# Patient Record
Sex: FEMALE | Race: WHITE | Employment: OTHER | URBAN - METROPOLITAN AREA
[De-identification: names, ages, dates, MRNs, and addresses within clinical notes are randomized per-mention and may not be internally consistent; named-entity substitution may affect disease eponyms.]

---

## 2018-10-05 ENCOUNTER — OFFICE VISIT (OUTPATIENT)
Dept: FAMILY MEDICINE CLINIC | Facility: CLINIC | Age: 58
End: 2018-10-05

## 2018-10-05 VITALS
TEMPERATURE: 98 F | WEIGHT: 118 LBS | BODY MASS INDEX: 22.57 KG/M2 | RESPIRATION RATE: 16 BRPM | HEART RATE: 70 BPM | OXYGEN SATURATION: 99 % | HEIGHT: 60.5 IN | DIASTOLIC BLOOD PRESSURE: 70 MMHG | SYSTOLIC BLOOD PRESSURE: 120 MMHG

## 2018-10-05 DIAGNOSIS — E04.1 THYROID NODULE: ICD-10-CM

## 2018-10-05 DIAGNOSIS — Z12.4 SCREENING FOR CERVICAL CANCER: ICD-10-CM

## 2018-10-05 DIAGNOSIS — Z00.00 PHYSICAL EXAM, ANNUAL: Primary | ICD-10-CM

## 2018-10-05 DIAGNOSIS — Z12.31 ENCOUNTER FOR SCREENING MAMMOGRAM FOR BREAST CANCER: ICD-10-CM

## 2018-10-05 PROCEDURE — 87624 HPV HI-RISK TYP POOLED RSLT: CPT | Performed by: FAMILY MEDICINE

## 2018-10-05 PROCEDURE — 99396 PREV VISIT EST AGE 40-64: CPT | Performed by: FAMILY MEDICINE

## 2018-10-05 NOTE — PATIENT INSTRUCTIONS
Call [phone] to schedule Mammogram result of the thyroid. Do fasting blood work. Healthy diet. Stay active.

## 2018-10-05 NOTE — PROGRESS NOTES
HPI:   Louise Falcon is a 63year old female who presents for a complete physical exam. Symptoms: denies discharge, itching, burning or dysuria. Patient has no complains. Patient has a history of thyroid nodule.   Did not check ultrasound is recommended walking.   Diet: watches calories closely     REVIEW OF SYSTEMS:   GENERAL: feels well otherwise  SKIN: denies any unusual skin lesions  EYES:denies blurred vision or double vision  HEENT: denies nasal congestion, sinus pain or ST  LUNGS: denies shortness o Visit:  Requested Prescriptions      No prescriptions requested or ordered in this encounter     Call [phone] to schedule US thyroid. Healthy diet. Stay active. Glucosamine/ chondroitin as needed over-the-counter for knee pains.   Imaging & Consult

## 2019-02-22 ENCOUNTER — LAB ENCOUNTER (OUTPATIENT)
Dept: LAB | Age: 59
End: 2019-02-22
Attending: FAMILY MEDICINE
Payer: COMMERCIAL

## 2019-02-22 ENCOUNTER — HOSPITAL ENCOUNTER (OUTPATIENT)
Dept: MAMMOGRAPHY | Age: 59
Discharge: HOME | End: 2019-02-22
Attending: FAMILY MEDICINE
Payer: COMMERCIAL

## 2019-02-22 ENCOUNTER — TELEPHONE (OUTPATIENT)
Dept: FAMILY MEDICINE CLINIC | Facility: CLINIC | Age: 59
End: 2019-02-22

## 2019-02-22 DIAGNOSIS — Z78.0 POST-MENOPAUSAL: Primary | ICD-10-CM

## 2019-02-22 DIAGNOSIS — Z12.31 ENCOUNTER FOR SCREENING MAMMOGRAM FOR BREAST CANCER: ICD-10-CM

## 2019-02-22 DIAGNOSIS — Z00.00 PHYSICAL EXAM, ANNUAL: ICD-10-CM

## 2019-02-22 LAB
ALBUMIN SERPL-MCNC: 3.8 G/DL (ref 3.4–5)
ALBUMIN/GLOB SERPL: 1 {RATIO} (ref 1–2)
ALP LIVER SERPL-CCNC: 61 U/L (ref 46–118)
ALT SERPL-CCNC: 24 U/L (ref 13–56)
ANION GAP SERPL CALC-SCNC: 7 MMOL/L (ref 0–18)
AST SERPL-CCNC: 19 U/L (ref 15–37)
BASOPHILS # BLD AUTO: 0.03 X10(3) UL (ref 0–0.2)
BASOPHILS NFR BLD AUTO: 0.6 %
BILIRUB SERPL-MCNC: 0.4 MG/DL (ref 0.1–2)
BUN BLD-MCNC: 15 MG/DL (ref 7–18)
BUN/CREAT SERPL: 18.8 (ref 10–20)
CALCIUM BLD-MCNC: 8.8 MG/DL (ref 8.5–10.1)
CHLORIDE SERPL-SCNC: 107 MMOL/L (ref 98–107)
CHOLEST SMN-MCNC: 239 MG/DL (ref ?–200)
CO2 SERPL-SCNC: 27 MMOL/L (ref 21–32)
CREAT BLD-MCNC: 0.8 MG/DL (ref 0.55–1.02)
DEPRECATED RDW RBC AUTO: 42.8 FL (ref 35.1–46.3)
EOSINOPHIL # BLD AUTO: 0.09 X10(3) UL (ref 0–0.7)
EOSINOPHIL NFR BLD AUTO: 1.7 %
ERYTHROCYTE [DISTWIDTH] IN BLOOD BY AUTOMATED COUNT: 13.2 % (ref 11–15)
GLOBULIN PLAS-MCNC: 3.7 G/DL (ref 2.8–4.4)
GLUCOSE BLD-MCNC: 90 MG/DL (ref 70–99)
HCT VFR BLD AUTO: 40.9 % (ref 35–48)
HDLC SERPL-MCNC: 59 MG/DL (ref 40–59)
HGB BLD-MCNC: 13.6 G/DL (ref 12–16)
IMM GRANULOCYTES # BLD AUTO: 0.01 X10(3) UL (ref 0–1)
IMM GRANULOCYTES NFR BLD: 0.2 %
LDLC SERPL CALC-MCNC: 162 MG/DL (ref ?–100)
LYMPHOCYTES # BLD AUTO: 2.64 X10(3) UL (ref 1–4)
LYMPHOCYTES NFR BLD AUTO: 51 %
M PROTEIN MFR SERPL ELPH: 7.5 G/DL (ref 6.4–8.2)
MCH RBC QN AUTO: 29.8 PG (ref 26–34)
MCHC RBC AUTO-ENTMCNC: 33.3 G/DL (ref 31–37)
MCV RBC AUTO: 89.5 FL (ref 80–100)
MONOCYTES # BLD AUTO: 0.34 X10(3) UL (ref 0.1–1)
MONOCYTES NFR BLD AUTO: 6.6 %
NEUTROPHILS # BLD AUTO: 2.07 X10 (3) UL (ref 1.5–7.7)
NEUTROPHILS # BLD AUTO: 2.07 X10(3) UL (ref 1.5–7.7)
NEUTROPHILS NFR BLD AUTO: 39.9 %
NONHDLC SERPL-MCNC: 180 MG/DL (ref ?–130)
OSMOLALITY SERPL CALC.SUM OF ELEC: 292 MOSM/KG (ref 275–295)
PLATELET # BLD AUTO: 265 10(3)UL (ref 150–450)
POTASSIUM SERPL-SCNC: 4.2 MMOL/L (ref 3.5–5.1)
RBC # BLD AUTO: 4.57 X10(6)UL (ref 3.8–5.3)
SODIUM SERPL-SCNC: 141 MMOL/L (ref 136–145)
TRIGL SERPL-MCNC: 88 MG/DL (ref 30–149)
TSI SER-ACNC: 1.09 MIU/ML (ref 0.36–3.74)
VLDLC SERPL CALC-MCNC: 18 MG/DL (ref 0–30)
WBC # BLD AUTO: 5.2 X10(3) UL (ref 4–11)

## 2019-02-22 PROCEDURE — 80050 GENERAL HEALTH PANEL: CPT | Performed by: FAMILY MEDICINE

## 2019-02-22 PROCEDURE — 77063 BREAST TOMOSYNTHESIS BI: CPT | Performed by: FAMILY MEDICINE

## 2019-02-22 PROCEDURE — 36415 COLL VENOUS BLD VENIPUNCTURE: CPT | Performed by: FAMILY MEDICINE

## 2019-02-22 PROCEDURE — 80061 LIPID PANEL: CPT | Performed by: FAMILY MEDICINE

## 2019-02-22 PROCEDURE — 77067 SCR MAMMO BI INCL CAD: CPT | Performed by: FAMILY MEDICINE

## 2019-02-22 NOTE — TELEPHONE ENCOUNTER
See below. Do you wish her to have a DEXA? I don't see she has ever had one. I have pended if you agree, dx post menopausal. Please advise thanks.

## 2019-02-22 NOTE — TELEPHONE ENCOUNTER
Pt daughter walked in today and stated that pt had an appt in October where her mammogram was ordered. Pt now has insurance and would like to get a DEXA scan as well. Please advise.

## 2019-02-22 NOTE — TELEPHONE ENCOUNTER
We usually order DEXA scan closer to age 62 for postmenopausal patients. I would suggest to call the insurance to make sure that this test is covered. Other option is to wait on the physical and we will discuss DEXA order at that time.   Thank you

## 2019-02-23 NOTE — TELEPHONE ENCOUNTER
I have left detailed msg per hippa advising of below from Dr Zaki Garay. Asked her to cb if further questions.

## 2019-10-28 ENCOUNTER — OFFICE VISIT (OUTPATIENT)
Dept: FAMILY MEDICINE CLINIC | Facility: CLINIC | Age: 59
End: 2019-10-28
Payer: COMMERCIAL

## 2019-10-28 VITALS
WEIGHT: 120 LBS | SYSTOLIC BLOOD PRESSURE: 136 MMHG | HEART RATE: 62 BPM | BODY MASS INDEX: 23.56 KG/M2 | DIASTOLIC BLOOD PRESSURE: 74 MMHG | HEIGHT: 60 IN | TEMPERATURE: 98 F | OXYGEN SATURATION: 98 %

## 2019-10-28 DIAGNOSIS — R42 VERTIGO: Primary | ICD-10-CM

## 2019-10-28 PROCEDURE — 99213 OFFICE O/P EST LOW 20 MIN: CPT | Performed by: NURSE PRACTITIONER

## 2019-10-28 RX ORDER — MECLIZINE HYDROCHLORIDE 25 MG/1
25 TABLET ORAL 3 TIMES DAILY PRN
Qty: 18 TABLET | Refills: 0 | Status: SHIPPED | OUTPATIENT
Start: 2019-10-28 | End: 2019-11-03

## 2019-10-28 NOTE — PROGRESS NOTES
CHIEF COMPLAINT:     Patient presents with:  Dizziness: dizzy, nausea, fatigue, indigestion, started today.   :      HPI:     Dot Mealing is a 63year old female presents with complaints of dizziness. Patient has had symptoms for 1.  Patient has had s GENERAL: well developed, well nourished,in no apparent distress  SKIN: no rashes or suspicious lesions  EYES: Pupils round, reactive to light and accomodation. EOMI. Fundoscopic exam negative for papilledema. HENT: atraumatic, normocephalic.  TMs no bul Keep all areas well lit. This helps your eyes send the right signals to the brain. It also makes you less likely to trip and fall. If bright lights make symptoms worse, dim the lights or lie in a dark room until the dizziness passes.  Then turn the lights b © 0641-0847 [employer] [address]. [address] [facility], [address] [place]. All rights reserved. This information is not intended as a substitute for professional medical care. Always follow your healthcare professional's instructions.             The

## 2019-10-28 NOTE — PATIENT INSTRUCTIONS
Dizziness (Vertigo) and Balance Problems: Staying Safe     Replace burned-out light bulbs to keep your home safe and well lit. Falls or accidents can lead to pain, broken bones, and fear of future falls.  Protect yourself and others by preparing for e · Take public transportation. · Walk to stores and other places when you can. Asking for help  Don't be afraid to ask for help running errands, cooking meals, and doing exercise.  Whether it's a friend, loved one, neighbor, or stranger on the street, deandre man

## 2020-11-17 ENCOUNTER — TELEMEDICINE (OUTPATIENT)
Dept: FAMILY MEDICINE CLINIC | Facility: CLINIC | Age: 60
End: 2020-11-17
Payer: COMMERCIAL

## 2020-11-17 DIAGNOSIS — R63.0 DECREASED APPETITE: ICD-10-CM

## 2020-11-17 DIAGNOSIS — R50.9 FEVER, UNSPECIFIED FEVER CAUSE: ICD-10-CM

## 2020-11-17 DIAGNOSIS — R52 BODY ACHES: ICD-10-CM

## 2020-11-17 DIAGNOSIS — R53.83 FATIGUE, UNSPECIFIED TYPE: ICD-10-CM

## 2020-11-17 DIAGNOSIS — R68.83 CHILLS: ICD-10-CM

## 2020-11-17 DIAGNOSIS — R05.9 COUGH: ICD-10-CM

## 2020-11-17 DIAGNOSIS — Z20.822 SUSPECTED COVID-19 VIRUS INFECTION: Primary | ICD-10-CM

## 2020-11-17 PROCEDURE — 99213 OFFICE O/P EST LOW 20 MIN: CPT | Performed by: FAMILY MEDICINE

## 2020-11-17 RX ORDER — AZITHROMYCIN 250 MG/1
TABLET, FILM COATED ORAL
Qty: 6 TABLET | Refills: 0 | Status: SHIPPED | OUTPATIENT
Start: 2020-11-17 | End: 2020-11-22

## 2020-11-17 NOTE — PROGRESS NOTES
Subjective   Fever, body aches, decreased appetite, stomach upset, cough, chills  HPI:     Telehealth outside of [facility] Consent   I conducted a telehealth visit with Juice Coleman today, 11/17/20, which was completed using two-way, r live, interactive video and audio. Patient contacted our office not feeling well for the past couple of weeks. She had intermittent fever on and off.   Recently noted that her fever in the morning as low today was 45.8 degrees of Celsius and at night g hydration. Start Zithromax since having intermittent fevers and take per directions. Take a probiotic daily over-the-counter for stomach upset  Tylenol as needed for fever or body aches over-the-counter. Mucinex DM as needed for cough over the counter.

## 2021-01-11 ENCOUNTER — LAB ENCOUNTER (OUTPATIENT)
Dept: LAB | Age: 61
End: 2021-01-11
Attending: FAMILY MEDICINE
Payer: COMMERCIAL

## 2021-01-11 ENCOUNTER — OFFICE VISIT (OUTPATIENT)
Dept: FAMILY MEDICINE CLINIC | Facility: CLINIC | Age: 61
End: 2021-01-11
Payer: COMMERCIAL

## 2021-01-11 VITALS
SYSTOLIC BLOOD PRESSURE: 110 MMHG | BODY MASS INDEX: 22.76 KG/M2 | OXYGEN SATURATION: 100 % | RESPIRATION RATE: 16 BRPM | HEART RATE: 67 BPM | WEIGHT: 119 LBS | TEMPERATURE: 98 F | HEIGHT: 60.5 IN | DIASTOLIC BLOOD PRESSURE: 62 MMHG

## 2021-01-11 DIAGNOSIS — Z00.00 PHYSICAL EXAM, ANNUAL: Primary | ICD-10-CM

## 2021-01-11 DIAGNOSIS — E55.9 VITAMIN D DEFICIENCY: ICD-10-CM

## 2021-01-11 DIAGNOSIS — Z12.31 ENCOUNTER FOR SCREENING MAMMOGRAM FOR MALIGNANT NEOPLASM OF BREAST: ICD-10-CM

## 2021-01-11 DIAGNOSIS — Z13.89 SCREENING FOR GENITOURINARY CONDITION: ICD-10-CM

## 2021-01-11 DIAGNOSIS — Z00.00 LABORATORY EXAMINATION ORDERED AS PART OF A ROUTINE GENERAL MEDICAL EXAMINATION: ICD-10-CM

## 2021-01-11 DIAGNOSIS — Z12.4 SCREENING FOR MALIGNANT NEOPLASM OF CERVIX: ICD-10-CM

## 2021-01-11 DIAGNOSIS — E04.1 THYROID NODULE: ICD-10-CM

## 2021-01-11 LAB
ALBUMIN SERPL-MCNC: 4 G/DL (ref 3.4–5)
ALBUMIN/GLOB SERPL: 1.1 {RATIO} (ref 1–2)
ALP LIVER SERPL-CCNC: 58 U/L
ALT SERPL-CCNC: 27 U/L
ANION GAP SERPL CALC-SCNC: 6 MMOL/L (ref 0–18)
AST SERPL-CCNC: 24 U/L (ref 15–37)
BASOPHILS # BLD AUTO: 0.05 X10(3) UL (ref 0–0.2)
BASOPHILS NFR BLD AUTO: 0.9 %
BILIRUB SERPL-MCNC: 0.4 MG/DL (ref 0.1–2)
BILIRUB UR QL STRIP.AUTO: NEGATIVE
BUN BLD-MCNC: 13 MG/DL (ref 7–18)
BUN/CREAT SERPL: 16.3 (ref 10–20)
CALCIUM BLD-MCNC: 9.2 MG/DL (ref 8.5–10.1)
CHLORIDE SERPL-SCNC: 105 MMOL/L (ref 98–112)
CHOLEST SMN-MCNC: 252 MG/DL (ref ?–200)
CLARITY UR REFRACT.AUTO: CLEAR
CO2 SERPL-SCNC: 27 MMOL/L (ref 21–32)
COLOR UR AUTO: YELLOW
CREAT BLD-MCNC: 0.8 MG/DL
DEPRECATED RDW RBC AUTO: 45.5 FL (ref 35.1–46.3)
EOSINOPHIL # BLD AUTO: 0.06 X10(3) UL (ref 0–0.7)
EOSINOPHIL NFR BLD AUTO: 1.1 %
ERYTHROCYTE [DISTWIDTH] IN BLOOD BY AUTOMATED COUNT: 13.7 % (ref 11–15)
GLOBULIN PLAS-MCNC: 3.6 G/DL (ref 2.8–4.4)
GLUCOSE BLD-MCNC: 91 MG/DL (ref 70–99)
GLUCOSE UR STRIP.AUTO-MCNC: NEGATIVE MG/DL
HCT VFR BLD AUTO: 43.6 %
HDLC SERPL-MCNC: 69 MG/DL (ref 40–59)
HGB BLD-MCNC: 14.3 G/DL
IMM GRANULOCYTES # BLD AUTO: 0.01 X10(3) UL (ref 0–1)
IMM GRANULOCYTES NFR BLD: 0.2 %
KETONES UR STRIP.AUTO-MCNC: NEGATIVE MG/DL
LDLC SERPL CALC-MCNC: 160 MG/DL (ref ?–100)
LEUKOCYTE ESTERASE UR QL STRIP.AUTO: NEGATIVE
LYMPHOCYTES # BLD AUTO: 2.13 X10(3) UL (ref 1–4)
LYMPHOCYTES NFR BLD AUTO: 38.4 %
M PROTEIN MFR SERPL ELPH: 7.6 G/DL (ref 6.4–8.2)
MCH RBC QN AUTO: 29.7 PG (ref 26–34)
MCHC RBC AUTO-ENTMCNC: 32.8 G/DL (ref 31–37)
MCV RBC AUTO: 90.6 FL
MONOCYTES # BLD AUTO: 0.32 X10(3) UL (ref 0.1–1)
MONOCYTES NFR BLD AUTO: 5.8 %
NEUTROPHILS # BLD AUTO: 2.97 X10 (3) UL (ref 1.5–7.7)
NEUTROPHILS # BLD AUTO: 2.97 X10(3) UL (ref 1.5–7.7)
NEUTROPHILS NFR BLD AUTO: 53.6 %
NITRITE UR QL STRIP.AUTO: NEGATIVE
NONHDLC SERPL-MCNC: 183 MG/DL (ref ?–130)
OSMOLALITY SERPL CALC.SUM OF ELEC: 286 MOSM/KG (ref 275–295)
PATIENT FASTING Y/N/NP: YES
PATIENT FASTING Y/N/NP: YES
PH UR STRIP.AUTO: 6 [PH] (ref 4.5–8)
PLATELET # BLD AUTO: 294 10(3)UL (ref 150–450)
POTASSIUM SERPL-SCNC: 5.1 MMOL/L (ref 3.5–5.1)
PROT UR STRIP.AUTO-MCNC: NEGATIVE MG/DL
RBC # BLD AUTO: 4.81 X10(6)UL
RBC UR QL AUTO: NEGATIVE
SODIUM SERPL-SCNC: 138 MMOL/L (ref 136–145)
SP GR UR STRIP.AUTO: 1.01 (ref 1–1.03)
TRIGL SERPL-MCNC: 115 MG/DL (ref 30–149)
TSI SER-ACNC: 0.88 MIU/ML (ref 0.36–3.74)
UROBILINOGEN UR STRIP.AUTO-MCNC: <2 MG/DL
VIT D+METAB SERPL-MCNC: 36.3 NG/ML (ref 30–100)
VLDLC SERPL CALC-MCNC: 23 MG/DL (ref 0–30)
WBC # BLD AUTO: 5.5 X10(3) UL (ref 4–11)

## 2021-01-11 PROCEDURE — 82306 VITAMIN D 25 HYDROXY: CPT | Performed by: FAMILY MEDICINE

## 2021-01-11 PROCEDURE — 3078F DIAST BP <80 MM HG: CPT | Performed by: FAMILY MEDICINE

## 2021-01-11 PROCEDURE — 3008F BODY MASS INDEX DOCD: CPT | Performed by: FAMILY MEDICINE

## 2021-01-11 PROCEDURE — 80061 LIPID PANEL: CPT | Performed by: FAMILY MEDICINE

## 2021-01-11 PROCEDURE — 88175 CYTOPATH C/V AUTO FLUID REDO: CPT | Performed by: FAMILY MEDICINE

## 2021-01-11 PROCEDURE — 87624 HPV HI-RISK TYP POOLED RSLT: CPT | Performed by: FAMILY MEDICINE

## 2021-01-11 PROCEDURE — 80050 GENERAL HEALTH PANEL: CPT | Performed by: FAMILY MEDICINE

## 2021-01-11 PROCEDURE — 99396 PREV VISIT EST AGE 40-64: CPT | Performed by: FAMILY MEDICINE

## 2021-01-11 PROCEDURE — 81003 URINALYSIS AUTO W/O SCOPE: CPT | Performed by: FAMILY MEDICINE

## 2021-01-11 PROCEDURE — 36415 COLL VENOUS BLD VENIPUNCTURE: CPT | Performed by: FAMILY MEDICINE

## 2021-01-11 PROCEDURE — 3074F SYST BP LT 130 MM HG: CPT | Performed by: FAMILY MEDICINE

## 2021-01-11 NOTE — PROGRESS NOTES
HPI:   Kalpana Sr is a 62year old female who presents for a complete physical exam. Symptoms: denies discharge, itching, burning or dysuria. Patient has no complains. Patient has a history of thyroid nodule.   Did not check ultrasound is recommended • Hypertension Sister    • Other (Other) Sister         kidney stones      Social History:   Social History    Tobacco Use      Smoking status: Never Smoker      Smokeless tobacco: Never Used    Alcohol use: No      Alcohol/week: 0.0 standard drinks intact,motor and sensory are grossly intact    ASSESSMENT AND PLAN:   Heather Beavers is a 62year old female who presents for a complete physical exam.  Physical exam, annual  (primary encounter diagnosis)  Laboratory examination ordered as part of a rou

## 2021-01-13 LAB — HPV I/H RISK 1 DNA SPEC QL NAA+PROBE: NEGATIVE

## 2021-04-19 ENCOUNTER — HOSPITAL ENCOUNTER (OUTPATIENT)
Dept: MAMMOGRAPHY | Age: 61
Discharge: HOME | End: 2021-04-19
Attending: FAMILY MEDICINE
Payer: COMMERCIAL

## 2021-04-19 DIAGNOSIS — Z12.31 ENCOUNTER FOR SCREENING MAMMOGRAM FOR MALIGNANT NEOPLASM OF BREAST: ICD-10-CM

## 2021-04-19 PROCEDURE — 77063 BREAST TOMOSYNTHESIS BI: CPT | Performed by: FAMILY MEDICINE

## 2021-04-19 PROCEDURE — 77067 SCR MAMMO BI INCL CAD: CPT | Performed by: FAMILY MEDICINE

## 2021-04-21 ENCOUNTER — HOSPITAL ENCOUNTER (OUTPATIENT)
Dept: MAMMOGRAPHY | Facility: HOSPITAL | Age: 61
Discharge: HOME | End: 2021-04-21
Attending: FAMILY MEDICINE
Payer: COMMERCIAL

## 2021-04-21 DIAGNOSIS — R92.2 INCONCLUSIVE MAMMOGRAM: ICD-10-CM

## 2021-04-21 PROCEDURE — 77065 DX MAMMO INCL CAD UNI: CPT | Performed by: FAMILY MEDICINE

## 2021-04-21 PROCEDURE — 77061 BREAST TOMOSYNTHESIS UNI: CPT | Performed by: FAMILY MEDICINE

## 2021-04-21 PROCEDURE — 76642 ULTRASOUND BREAST LIMITED: CPT | Performed by: FAMILY MEDICINE

## 2022-07-18 ENCOUNTER — OFFICE VISIT (OUTPATIENT)
Dept: FAMILY MEDICINE CLINIC | Facility: CLINIC | Age: 62
End: 2022-07-18
Payer: COMMERCIAL

## 2022-07-18 VITALS
WEIGHT: 123 LBS | DIASTOLIC BLOOD PRESSURE: 76 MMHG | SYSTOLIC BLOOD PRESSURE: 120 MMHG | RESPIRATION RATE: 16 BRPM | HEIGHT: 60.5 IN | HEART RATE: 68 BPM | TEMPERATURE: 98 F | BODY MASS INDEX: 23.52 KG/M2

## 2022-07-18 DIAGNOSIS — Z00.00 PHYSICAL EXAM, ANNUAL: Primary | ICD-10-CM

## 2022-07-18 DIAGNOSIS — Z00.00 LABORATORY EXAMINATION ORDERED AS PART OF A ROUTINE GENERAL MEDICAL EXAMINATION: ICD-10-CM

## 2022-07-18 DIAGNOSIS — Z12.31 SCREENING MAMMOGRAM FOR BREAST CANCER: ICD-10-CM

## 2022-07-18 DIAGNOSIS — Z13.89 SCREENING FOR GENITOURINARY CONDITION: ICD-10-CM

## 2022-07-18 DIAGNOSIS — E55.9 VITAMIN D DEFICIENCY: ICD-10-CM

## 2022-07-18 NOTE — PATIENT INSTRUCTIONS
Shingrix-shingles shot. Healthy diet. Stay active. Call [phone] to schedule Mammogram.  Fasting blood work.

## 2022-07-20 ENCOUNTER — LAB ENCOUNTER (OUTPATIENT)
Dept: LAB | Age: 62
End: 2022-07-20
Attending: FAMILY MEDICINE
Payer: COMMERCIAL

## 2022-07-20 DIAGNOSIS — E55.9 VITAMIN D DEFICIENCY: ICD-10-CM

## 2022-07-20 DIAGNOSIS — Z13.89 SCREENING FOR GENITOURINARY CONDITION: ICD-10-CM

## 2022-07-20 DIAGNOSIS — Z00.00 PHYSICAL EXAM, ANNUAL: ICD-10-CM

## 2022-07-20 LAB
ALBUMIN SERPL-MCNC: 3.6 G/DL (ref 3.4–5)
ALBUMIN/GLOB SERPL: 1 {RATIO} (ref 1–2)
ALP LIVER SERPL-CCNC: 61 U/L
ALT SERPL-CCNC: 18 U/L
ANION GAP SERPL CALC-SCNC: 4 MMOL/L (ref 0–18)
AST SERPL-CCNC: 15 U/L (ref 15–37)
BASOPHILS # BLD AUTO: 0.04 X10(3) UL (ref 0–0.2)
BASOPHILS NFR BLD AUTO: 0.5 %
BILIRUB SERPL-MCNC: 0.6 MG/DL (ref 0.1–2)
BILIRUB UR QL STRIP.AUTO: NEGATIVE
BUN BLD-MCNC: 17 MG/DL (ref 7–18)
CALCIUM BLD-MCNC: 9.4 MG/DL (ref 8.5–10.1)
CHLORIDE SERPL-SCNC: 107 MMOL/L (ref 98–112)
CHOLEST SERPL-MCNC: 246 MG/DL (ref ?–200)
CLARITY UR REFRACT.AUTO: CLEAR
CO2 SERPL-SCNC: 26 MMOL/L (ref 21–32)
COLOR UR AUTO: YELLOW
CREAT BLD-MCNC: 0.9 MG/DL
EOSINOPHIL # BLD AUTO: 0.11 X10(3) UL (ref 0–0.7)
EOSINOPHIL NFR BLD AUTO: 1.5 %
ERYTHROCYTE [DISTWIDTH] IN BLOOD BY AUTOMATED COUNT: 13.2 %
FASTING PATIENT LIPID ANSWER: YES
FASTING STATUS PATIENT QL REPORTED: YES
GLOBULIN PLAS-MCNC: 3.7 G/DL (ref 2.8–4.4)
GLUCOSE BLD-MCNC: 100 MG/DL (ref 70–99)
GLUCOSE UR STRIP.AUTO-MCNC: NEGATIVE MG/DL
HCT VFR BLD AUTO: 43.1 %
HDLC SERPL-MCNC: 60 MG/DL (ref 40–59)
HGB BLD-MCNC: 13.6 G/DL
IMM GRANULOCYTES # BLD AUTO: 0.02 X10(3) UL (ref 0–1)
IMM GRANULOCYTES NFR BLD: 0.3 %
KETONES UR STRIP.AUTO-MCNC: NEGATIVE MG/DL
LDLC SERPL CALC-MCNC: 164 MG/DL (ref ?–100)
LEUKOCYTE ESTERASE UR QL STRIP.AUTO: NEGATIVE
LYMPHOCYTES # BLD AUTO: 2.12 X10(3) UL (ref 1–4)
LYMPHOCYTES NFR BLD AUTO: 29 %
MCH RBC QN AUTO: 29.4 PG (ref 26–34)
MCHC RBC AUTO-ENTMCNC: 31.6 G/DL (ref 31–37)
MCV RBC AUTO: 93.3 FL
MONOCYTES # BLD AUTO: 0.47 X10(3) UL (ref 0.1–1)
MONOCYTES NFR BLD AUTO: 6.4 %
NEUTROPHILS # BLD AUTO: 4.55 X10 (3) UL (ref 1.5–7.7)
NEUTROPHILS # BLD AUTO: 4.55 X10(3) UL (ref 1.5–7.7)
NEUTROPHILS NFR BLD AUTO: 62.3 %
NITRITE UR QL STRIP.AUTO: NEGATIVE
NONHDLC SERPL-MCNC: 186 MG/DL (ref ?–130)
OSMOLALITY SERPL CALC.SUM OF ELEC: 286 MOSM/KG (ref 275–295)
PH UR STRIP.AUTO: 6.5 [PH] (ref 5–8)
PLATELET # BLD AUTO: 283 10(3)UL (ref 150–450)
POTASSIUM SERPL-SCNC: 4.7 MMOL/L (ref 3.5–5.1)
PROT SERPL-MCNC: 7.3 G/DL (ref 6.4–8.2)
PROT UR STRIP.AUTO-MCNC: NEGATIVE MG/DL
RBC # BLD AUTO: 4.62 X10(6)UL
RBC UR QL AUTO: NEGATIVE
SODIUM SERPL-SCNC: 137 MMOL/L (ref 136–145)
SP GR UR STRIP.AUTO: 1.02 (ref 1–1.03)
TRIGL SERPL-MCNC: 123 MG/DL (ref 30–149)
TSI SER-ACNC: 0.96 MIU/ML (ref 0.36–3.74)
UROBILINOGEN UR STRIP.AUTO-MCNC: 0.2 MG/DL
VIT D+METAB SERPL-MCNC: 28.8 NG/ML (ref 30–100)
VLDLC SERPL CALC-MCNC: 24 MG/DL (ref 0–30)
WBC # BLD AUTO: 7.3 X10(3) UL (ref 4–11)

## 2022-07-20 PROCEDURE — 80053 COMPREHEN METABOLIC PANEL: CPT

## 2022-07-20 PROCEDURE — 82306 VITAMIN D 25 HYDROXY: CPT

## 2022-07-20 PROCEDURE — 36415 COLL VENOUS BLD VENIPUNCTURE: CPT

## 2022-07-20 PROCEDURE — 84443 ASSAY THYROID STIM HORMONE: CPT

## 2022-07-20 PROCEDURE — 80061 LIPID PANEL: CPT

## 2022-07-20 PROCEDURE — 81003 URINALYSIS AUTO W/O SCOPE: CPT

## 2022-07-20 PROCEDURE — 85025 COMPLETE CBC W/AUTO DIFF WBC: CPT

## 2022-07-22 DIAGNOSIS — R73.9 HYPERGLYCEMIA: ICD-10-CM

## 2022-07-22 DIAGNOSIS — E55.9 VITAMIN D DEFICIENCY: Primary | ICD-10-CM

## 2022-07-22 DIAGNOSIS — E78.00 HYPERCHOLESTEROLEMIA: ICD-10-CM

## 2022-07-25 DIAGNOSIS — E55.9 VITAMIN D DEFICIENCY: Primary | ICD-10-CM

## 2022-07-29 ENCOUNTER — HOSPITAL ENCOUNTER (OUTPATIENT)
Dept: MAMMOGRAPHY | Age: 62
Discharge: HOME | End: 2022-07-29
Attending: FAMILY MEDICINE
Payer: COMMERCIAL

## 2022-07-29 DIAGNOSIS — Z12.31 SCREENING MAMMOGRAM FOR BREAST CANCER: ICD-10-CM

## 2022-07-29 PROCEDURE — 77063 BREAST TOMOSYNTHESIS BI: CPT | Performed by: FAMILY MEDICINE

## 2022-07-29 PROCEDURE — 77067 SCR MAMMO BI INCL CAD: CPT | Performed by: FAMILY MEDICINE

## 2022-10-08 ENCOUNTER — HOSPITAL ENCOUNTER (OUTPATIENT)
Dept: CT IMAGING | Facility: HOSPITAL | Age: 62
Discharge: HOME | End: 2022-10-08
Attending: FAMILY MEDICINE

## 2022-10-08 DIAGNOSIS — Z13.6 SCREENING FOR CARDIOVASCULAR CONDITION: ICD-10-CM

## 2022-10-08 NOTE — PROGRESS NOTES
Date of Service 10/8/2022    Violette García  Date of Birth 9/29/1960    Patient Age: 65year old    [facility]    Consult Type  Type Scan/Screening: Heart Scan  Preliminary Heart Scan Score: 71.53     Consult completed with daughter due to language barrier                Body Mass Index  There is no height or weight on file to calculate BMI. Lipid Profile  Cholesterol: 246, done on 7/20/2022. HDL Cholesterol: 60, done on 7/20/2022. LDL Cholesterol: 164, done on 7/20/2022. TriGlycerides 123, done on 7/20/2022. Glucose 100, done one 7/20/2022    Nurse Review  Risk factor information and results reviewed with Nurse: Yes, family history, cholesterol levels    Recommended Follow Up:  Consult your physician regarding[de-identified] Final Heart Scan Report; Discuss potential for Incidental Finding    Free PV Screening offered to patient. To be scheduled for AAA and carotid screening.  will call daughter to schedule, due to language barrier      Recommendations for Change:  Nutrition Changes: Low Saturated Fat;Low Fat Dairy trend more toward plant based and fish  Cholesterol Modification (goal of therapy depends upon your risk): Decrease LDL (Lousy/Bad) Ideal <100;Decrease Total Normal <200  Exercise: No Change Needed, rides bike, walks on treadmill or outdoors regularly     Weight Management: Maintain Current Weight     Repeat Heart Scan: 3 Years if Calcium Score is > 0. 0; Discuss with your Physician          Adrián Recommended Resources:  Recommended Resources: Upcoming Classes, Medical Services and Health Library [email or web address]. Ranjeet Villar, RN        Please Contact the Nurse Heart Line with any Questions or Concerns [phone].

## 2022-10-10 ENCOUNTER — ORDER TRANSCRIPTION (OUTPATIENT)
Dept: ADMINISTRATIVE | Facility: HOSPITAL | Age: 62
End: 2022-10-10

## 2022-10-10 DIAGNOSIS — Z13.9 ENCOUNTER FOR SCREENING: Primary | ICD-10-CM

## 2022-10-27 ENCOUNTER — HOSPITAL ENCOUNTER (OUTPATIENT)
Dept: ULTRASOUND IMAGING | Facility: HOSPITAL | Age: 62
Discharge: HOME | End: 2022-10-27
Attending: FAMILY MEDICINE

## 2022-10-27 DIAGNOSIS — Z13.9 ENCOUNTER FOR SCREENING: ICD-10-CM

## 2022-10-31 ENCOUNTER — OFFICE VISIT (OUTPATIENT)
Dept: FAMILY MEDICINE CLINIC | Facility: CLINIC | Age: 62
End: 2022-10-31
Payer: COMMERCIAL

## 2022-10-31 VITALS — WEIGHT: 120 LBS | BODY MASS INDEX: 23.56 KG/M2 | HEIGHT: 60 IN

## 2022-10-31 DIAGNOSIS — E78.00 HYPERCHOLESTEREMIA: Primary | ICD-10-CM

## 2022-10-31 DIAGNOSIS — R93.1 ABNORMAL CT SCAN OF HEART: ICD-10-CM

## 2022-10-31 DIAGNOSIS — R94.31 T WAVE INVERSION IN EKG: ICD-10-CM

## 2022-10-31 DIAGNOSIS — I25.10 ATHEROSCLEROSIS OF NATIVE CORONARY ARTERY OF NATIVE HEART WITHOUT ANGINA PECTORIS: ICD-10-CM

## 2022-10-31 DIAGNOSIS — Z82.49 FAMILY HISTORY OF CORONARY ARTERY DISEASE: ICD-10-CM

## 2022-10-31 DIAGNOSIS — I65.21 CAROTID STENOSIS, RIGHT: ICD-10-CM

## 2022-10-31 LAB
ATRIAL RATE: 59 BPM
P AXIS: 34 DEGREES
P-R INTERVAL: 148 MS
Q-T INTERVAL: 412 MS
QRS DURATION: 74 MS
QTC CALCULATION (BEZET): 407 MS
R AXIS: 49 DEGREES
T AXIS: 57 DEGREES
VENTRICULAR RATE: 59 BPM

## 2022-10-31 PROCEDURE — 99214 OFFICE O/P EST MOD 30 MIN: CPT | Performed by: FAMILY MEDICINE

## 2022-10-31 PROCEDURE — 3008F BODY MASS INDEX DOCD: CPT | Performed by: FAMILY MEDICINE

## 2022-10-31 PROCEDURE — 93000 ELECTROCARDIOGRAM COMPLETE: CPT | Performed by: FAMILY MEDICINE

## 2022-10-31 RX ORDER — ROSUVASTATIN CALCIUM 5 MG/1
5 TABLET, COATED ORAL NIGHTLY
Qty: 30 TABLET | Refills: 2 | Status: SHIPPED | OUTPATIENT
Start: 2022-10-31

## 2022-10-31 NOTE — PATIENT INSTRUCTIONS
Start rosuvastatin 5 mg 1 tablet daily. Start aspirin 81 mg daily. Low-fat diet avoid red meat, no butter ,no cheese, less egg yolks. Good hydration. Stay active. Call [phone] to schedule nuclear stress test and ultrasound of the carotids.

## 2023-02-13 DIAGNOSIS — I65.21 CAROTID STENOSIS, RIGHT: ICD-10-CM

## 2023-02-13 DIAGNOSIS — E78.00 HYPERCHOLESTEREMIA: ICD-10-CM

## 2023-02-13 DIAGNOSIS — I25.10 ATHEROSCLEROSIS OF NATIVE CORONARY ARTERY OF NATIVE HEART WITHOUT ANGINA PECTORIS: ICD-10-CM

## 2023-02-13 DIAGNOSIS — R93.1 ABNORMAL CT SCAN OF HEART: ICD-10-CM

## 2023-02-13 DIAGNOSIS — Z82.49 FAMILY HISTORY OF CORONARY ARTERY DISEASE: ICD-10-CM

## 2023-02-13 RX ORDER — ROSUVASTATIN CALCIUM 5 MG/1
TABLET, COATED ORAL
Qty: 30 TABLET | Refills: 2 | Status: SHIPPED | OUTPATIENT
Start: 2023-02-13

## 2023-05-17 ENCOUNTER — APPOINTMENT (OUTPATIENT)
Dept: CT IMAGING | Facility: HOSPITAL | Age: 63
End: 2023-05-17
Attending: STUDENT IN AN ORGANIZED HEALTH CARE EDUCATION/TRAINING PROGRAM
Payer: COMMERCIAL

## 2023-05-17 ENCOUNTER — HOSPITAL ENCOUNTER (EMERGENCY)
Facility: HOSPITAL | Age: 63
Discharge: HOME | End: 2023-05-17
Attending: STUDENT IN AN ORGANIZED HEALTH CARE EDUCATION/TRAINING PROGRAM
Payer: COMMERCIAL

## 2023-05-17 ENCOUNTER — APPOINTMENT (OUTPATIENT)
Dept: GENERAL RADIOLOGY | Facility: HOSPITAL | Age: 63
End: 2023-05-17
Attending: STUDENT IN AN ORGANIZED HEALTH CARE EDUCATION/TRAINING PROGRAM
Payer: COMMERCIAL

## 2023-05-17 ENCOUNTER — APPOINTMENT (OUTPATIENT)
Dept: ULTRASOUND IMAGING | Facility: HOSPITAL | Age: 63
End: 2023-05-17
Attending: STUDENT IN AN ORGANIZED HEALTH CARE EDUCATION/TRAINING PROGRAM
Payer: COMMERCIAL

## 2023-05-17 VITALS
OXYGEN SATURATION: 98 % | TEMPERATURE: 97 F | SYSTOLIC BLOOD PRESSURE: 169 MMHG | DIASTOLIC BLOOD PRESSURE: 68 MMHG | RESPIRATION RATE: 18 BRPM | HEART RATE: 61 BPM

## 2023-05-17 DIAGNOSIS — R10.9 FLANK PAIN: ICD-10-CM

## 2023-05-17 DIAGNOSIS — K59.00 CONSTIPATION, UNSPECIFIED CONSTIPATION TYPE: Primary | ICD-10-CM

## 2023-05-17 LAB
ALBUMIN SERPL-MCNC: 3.9 G/DL (ref 3.4–5)
ALBUMIN/GLOB SERPL: 1.2 {RATIO} (ref 1–2)
ALP LIVER SERPL-CCNC: 59 U/L
ALT SERPL-CCNC: 23 U/L
ANION GAP SERPL CALC-SCNC: 7 MMOL/L (ref 0–18)
AST SERPL-CCNC: 20 U/L (ref 15–37)
ATRIAL RATE: 60 BPM
BASOPHILS # BLD AUTO: 0.04 X10(3) UL (ref 0–0.2)
BASOPHILS NFR BLD AUTO: 0.5 %
BILIRUB SERPL-MCNC: 0.3 MG/DL (ref 0.1–2)
BILIRUB UR QL STRIP.AUTO: NEGATIVE
BUN BLD-MCNC: 23 MG/DL (ref 7–18)
CALCIUM BLD-MCNC: 9 MG/DL (ref 8.5–10.1)
CHLORIDE SERPL-SCNC: 101 MMOL/L (ref 98–112)
CLARITY UR REFRACT.AUTO: CLEAR
CO2 SERPL-SCNC: 24 MMOL/L (ref 21–32)
COLOR UR AUTO: YELLOW
CREAT BLD-MCNC: 0.86 MG/DL
EOSINOPHIL # BLD AUTO: 0.01 X10(3) UL (ref 0–0.7)
EOSINOPHIL NFR BLD AUTO: 0.1 %
ERYTHROCYTE [DISTWIDTH] IN BLOOD BY AUTOMATED COUNT: 12.5 %
GFR SERPLBLD BASED ON 1.73 SQ M-ARVRAT: 77 ML/MIN/1.73M2 (ref 60–?)
GLOBULIN PLAS-MCNC: 3.2 G/DL (ref 2.8–4.4)
GLUCOSE BLD-MCNC: 119 MG/DL (ref 70–99)
GLUCOSE UR STRIP.AUTO-MCNC: NEGATIVE MG/DL
HCT VFR BLD AUTO: 38.9 %
HGB BLD-MCNC: 12.9 G/DL
IMM GRANULOCYTES # BLD AUTO: 0.04 X10(3) UL (ref 0–1)
IMM GRANULOCYTES NFR BLD: 0.5 %
KETONES UR STRIP.AUTO-MCNC: 80 MG/DL
LEUKOCYTE ESTERASE UR QL STRIP.AUTO: NEGATIVE
LIPASE SERPL-CCNC: 36 U/L (ref 13–75)
LYMPHOCYTES # BLD AUTO: 1.28 X10(3) UL (ref 1–4)
LYMPHOCYTES NFR BLD AUTO: 15.8 %
MCH RBC QN AUTO: 29.3 PG (ref 26–34)
MCHC RBC AUTO-ENTMCNC: 33.2 G/DL (ref 31–37)
MCV RBC AUTO: 88.4 FL
MONOCYTES # BLD AUTO: 0.24 X10(3) UL (ref 0.1–1)
MONOCYTES NFR BLD AUTO: 3 %
NEUTROPHILS # BLD AUTO: 6.5 X10 (3) UL (ref 1.5–7.7)
NEUTROPHILS # BLD AUTO: 6.5 X10(3) UL (ref 1.5–7.7)
NEUTROPHILS NFR BLD AUTO: 80.1 %
NITRITE UR QL STRIP.AUTO: NEGATIVE
OSMOLALITY SERPL CALC.SUM OF ELEC: 279 MOSM/KG (ref 275–295)
P AXIS: 70 DEGREES
P-R INTERVAL: 150 MS
PH UR STRIP.AUTO: 6 [PH] (ref 5–8)
PLATELET # BLD AUTO: 231 10(3)UL (ref 150–450)
POTASSIUM SERPL-SCNC: 3.9 MMOL/L (ref 3.5–5.1)
PROT SERPL-MCNC: 7.1 G/DL (ref 6.4–8.2)
PROT UR STRIP.AUTO-MCNC: 30 MG/DL
Q-T INTERVAL: 434 MS
QRS DURATION: 82 MS
QTC CALCULATION (BEZET): 434 MS
R AXIS: 49 DEGREES
RBC # BLD AUTO: 4.4 X10(6)UL
RBC UR QL AUTO: NEGATIVE
SODIUM SERPL-SCNC: 132 MMOL/L (ref 136–145)
SP GR UR STRIP.AUTO: >1.03 (ref 1–1.03)
T AXIS: 25 DEGREES
UROBILINOGEN UR STRIP.AUTO-MCNC: <2 MG/DL
VENTRICULAR RATE: 60 BPM
WBC # BLD AUTO: 8.1 X10(3) UL (ref 4–11)

## 2023-05-17 PROCEDURE — 81001 URINALYSIS AUTO W/SCOPE: CPT | Performed by: STUDENT IN AN ORGANIZED HEALTH CARE EDUCATION/TRAINING PROGRAM

## 2023-05-17 PROCEDURE — 93010 ELECTROCARDIOGRAM REPORT: CPT

## 2023-05-17 PROCEDURE — 96375 TX/PRO/DX INJ NEW DRUG ADDON: CPT

## 2023-05-17 PROCEDURE — 83690 ASSAY OF LIPASE: CPT

## 2023-05-17 PROCEDURE — 96374 THER/PROPH/DIAG INJ IV PUSH: CPT

## 2023-05-17 PROCEDURE — 80053 COMPREHEN METABOLIC PANEL: CPT | Performed by: STUDENT IN AN ORGANIZED HEALTH CARE EDUCATION/TRAINING PROGRAM

## 2023-05-17 PROCEDURE — 76700 US EXAM ABDOM COMPLETE: CPT | Performed by: STUDENT IN AN ORGANIZED HEALTH CARE EDUCATION/TRAINING PROGRAM

## 2023-05-17 PROCEDURE — 74177 CT ABD & PELVIS W/CONTRAST: CPT | Performed by: STUDENT IN AN ORGANIZED HEALTH CARE EDUCATION/TRAINING PROGRAM

## 2023-05-17 PROCEDURE — 93005 ELECTROCARDIOGRAM TRACING: CPT

## 2023-05-17 PROCEDURE — 80053 COMPREHEN METABOLIC PANEL: CPT

## 2023-05-17 PROCEDURE — 83690 ASSAY OF LIPASE: CPT | Performed by: STUDENT IN AN ORGANIZED HEALTH CARE EDUCATION/TRAINING PROGRAM

## 2023-05-17 PROCEDURE — 85025 COMPLETE CBC W/AUTO DIFF WBC: CPT | Performed by: STUDENT IN AN ORGANIZED HEALTH CARE EDUCATION/TRAINING PROGRAM

## 2023-05-17 PROCEDURE — 99285 EMERGENCY DEPT VISIT HI MDM: CPT

## 2023-05-17 PROCEDURE — 85025 COMPLETE CBC W/AUTO DIFF WBC: CPT

## 2023-05-17 PROCEDURE — 99284 EMERGENCY DEPT VISIT MOD MDM: CPT

## 2023-05-17 PROCEDURE — 71045 X-RAY EXAM CHEST 1 VIEW: CPT | Performed by: STUDENT IN AN ORGANIZED HEALTH CARE EDUCATION/TRAINING PROGRAM

## 2023-05-17 RX ORDER — POLYETHYLENE GLYCOL 3350 17 G/17G
17 POWDER, FOR SOLUTION ORAL DAILY PRN
Qty: 12 EACH | Refills: 0 | Status: SHIPPED | OUTPATIENT
Start: 2023-05-17 | End: 2023-06-16

## 2023-05-17 RX ORDER — MORPHINE SULFATE 4 MG/ML
4 INJECTION, SOLUTION INTRAMUSCULAR; INTRAVENOUS ONCE
Status: COMPLETED | OUTPATIENT
Start: 2023-05-17 | End: 2023-05-17

## 2023-05-17 RX ORDER — CYCLOBENZAPRINE HCL 10 MG
10 TABLET ORAL NIGHTLY
Qty: 20 TABLET | Refills: 0 | Status: SHIPPED | OUTPATIENT
Start: 2023-05-17 | End: 2023-05-24

## 2023-05-17 RX ORDER — DOCUSATE SODIUM 100 MG/1
100 CAPSULE, LIQUID FILLED ORAL 2 TIMES DAILY
Qty: 60 CAPSULE | Refills: 0 | Status: SHIPPED | OUTPATIENT
Start: 2023-05-17 | End: 2023-06-16

## 2023-05-17 NOTE — ED INITIAL ASSESSMENT (HPI)
A&Ox3 patient p/w c/o RUQ pain radiating to R flank    Sx started this morning, pain is sharp and intermittent    +nausea    Denies any cp/sob/v/d/c/f/LH/vision changes/urinary sx at this time  RR even/NL, speaking in full clear sentences, ambulatory w/ steady gait

## 2023-10-23 ENCOUNTER — OFFICE VISIT (OUTPATIENT)
Dept: FAMILY MEDICINE CLINIC | Facility: CLINIC | Age: 63
End: 2023-10-23
Payer: COMMERCIAL

## 2023-10-23 VITALS
DIASTOLIC BLOOD PRESSURE: 62 MMHG | WEIGHT: 119 LBS | BODY MASS INDEX: 23.36 KG/M2 | HEART RATE: 68 BPM | RESPIRATION RATE: 16 BRPM | SYSTOLIC BLOOD PRESSURE: 118 MMHG | HEIGHT: 60 IN | TEMPERATURE: 98 F

## 2023-10-23 DIAGNOSIS — Z00.00 PHYSICAL EXAM, ANNUAL: Primary | ICD-10-CM

## 2023-10-23 DIAGNOSIS — Z12.31 SCREENING MAMMOGRAM FOR BREAST CANCER: ICD-10-CM

## 2023-10-23 DIAGNOSIS — Z82.49 FAMILY HISTORY OF CORONARY ARTERY DISEASE: ICD-10-CM

## 2023-10-23 DIAGNOSIS — Z12.4 ENCOUNTER FOR SCREENING FOR CERVICAL CANCER: ICD-10-CM

## 2023-10-23 DIAGNOSIS — E04.1 THYROID NODULE: ICD-10-CM

## 2023-10-23 DIAGNOSIS — Z13.89 SCREENING FOR GENITOURINARY CONDITION: ICD-10-CM

## 2023-10-23 DIAGNOSIS — Z78.0 POSTMENOPAUSAL: ICD-10-CM

## 2023-10-23 DIAGNOSIS — Z00.00 LABORATORY EXAMINATION ORDERED AS PART OF A ROUTINE GENERAL MEDICAL EXAMINATION: ICD-10-CM

## 2023-10-23 DIAGNOSIS — E55.9 VITAMIN D DEFICIENCY: ICD-10-CM

## 2023-10-23 DIAGNOSIS — E78.00 HYPERCHOLESTEREMIA: ICD-10-CM

## 2023-10-23 PROCEDURE — 87624 HPV HI-RISK TYP POOLED RSLT: CPT | Performed by: FAMILY MEDICINE

## 2023-10-23 PROCEDURE — 3008F BODY MASS INDEX DOCD: CPT | Performed by: FAMILY MEDICINE

## 2023-10-23 PROCEDURE — 99396 PREV VISIT EST AGE 40-64: CPT | Performed by: FAMILY MEDICINE

## 2023-10-23 PROCEDURE — 3074F SYST BP LT 130 MM HG: CPT | Performed by: FAMILY MEDICINE

## 2023-10-23 PROCEDURE — 88175 CYTOPATH C/V AUTO FLUID REDO: CPT | Performed by: FAMILY MEDICINE

## 2023-10-23 PROCEDURE — 3078F DIAST BP <80 MM HG: CPT | Performed by: FAMILY MEDICINE

## 2023-10-23 RX ORDER — ROSUVASTATIN CALCIUM 5 MG/1
5 TABLET, COATED ORAL NIGHTLY
Qty: 30 TABLET | Refills: 2 | Status: SHIPPED | OUTPATIENT
Start: 2023-10-23

## 2023-10-23 NOTE — PATIENT INSTRUCTIONS
Call [phone] to schedule Mammogram, dexa scan and US thyroid. Healthy diet. Stay active. Do fasting labs. Shingrix-shingles shot.

## 2023-10-24 LAB — HPV I/H RISK 1 DNA SPEC QL NAA+PROBE: NEGATIVE

## 2023-10-27 LAB
.: NORMAL
.: NORMAL

## 2023-10-30 ENCOUNTER — LAB ENCOUNTER (OUTPATIENT)
Dept: LAB | Age: 63
End: 2023-10-30
Attending: FAMILY MEDICINE
Payer: COMMERCIAL

## 2023-10-30 DIAGNOSIS — E55.9 VITAMIN D DEFICIENCY: ICD-10-CM

## 2023-10-30 DIAGNOSIS — Z00.00 PHYSICAL EXAM, ANNUAL: ICD-10-CM

## 2023-10-30 DIAGNOSIS — Z13.89 SCREENING FOR GENITOURINARY CONDITION: ICD-10-CM

## 2023-10-30 LAB
ALBUMIN SERPL-MCNC: 3.6 G/DL (ref 3.4–5)
ALBUMIN/GLOB SERPL: 1 {RATIO} (ref 1–2)
ALP LIVER SERPL-CCNC: 60 U/L
ALT SERPL-CCNC: 25 U/L
ANION GAP SERPL CALC-SCNC: 2 MMOL/L (ref 0–18)
AST SERPL-CCNC: 17 U/L (ref 15–37)
BASOPHILS # BLD AUTO: 0.03 X10(3) UL (ref 0–0.2)
BASOPHILS NFR BLD AUTO: 0.7 %
BILIRUB SERPL-MCNC: 0.3 MG/DL (ref 0.1–2)
BILIRUB UR QL STRIP.AUTO: NEGATIVE
BUN BLD-MCNC: 17 MG/DL (ref 7–18)
CALCIUM BLD-MCNC: 9 MG/DL (ref 8.5–10.1)
CHLORIDE SERPL-SCNC: 106 MMOL/L (ref 98–112)
CHOLEST SERPL-MCNC: 189 MG/DL (ref ?–200)
CLARITY UR REFRACT.AUTO: CLEAR
CO2 SERPL-SCNC: 28 MMOL/L (ref 21–32)
COLOR UR AUTO: COLORLESS
CREAT BLD-MCNC: 0.87 MG/DL
EGFRCR SERPLBLD CKD-EPI 2021: 75 ML/MIN/1.73M2 (ref 60–?)
EOSINOPHIL # BLD AUTO: 0.07 X10(3) UL (ref 0–0.7)
EOSINOPHIL NFR BLD AUTO: 1.6 %
ERYTHROCYTE [DISTWIDTH] IN BLOOD BY AUTOMATED COUNT: 12.9 %
FASTING PATIENT LIPID ANSWER: YES
FASTING STATUS PATIENT QL REPORTED: YES
GLOBULIN PLAS-MCNC: 3.5 G/DL (ref 2.8–4.4)
GLUCOSE BLD-MCNC: 91 MG/DL (ref 70–99)
GLUCOSE UR STRIP.AUTO-MCNC: NORMAL MG/DL
HCT VFR BLD AUTO: 42.5 %
HDLC SERPL-MCNC: 62 MG/DL (ref 40–59)
HGB BLD-MCNC: 13.6 G/DL
IMM GRANULOCYTES # BLD AUTO: 0.01 X10(3) UL (ref 0–1)
IMM GRANULOCYTES NFR BLD: 0.2 %
KETONES UR STRIP.AUTO-MCNC: NEGATIVE MG/DL
LDLC SERPL CALC-MCNC: 111 MG/DL (ref ?–100)
LEUKOCYTE ESTERASE UR QL STRIP.AUTO: NEGATIVE
LYMPHOCYTES # BLD AUTO: 1.66 X10(3) UL (ref 1–4)
LYMPHOCYTES NFR BLD AUTO: 37.5 %
MCH RBC QN AUTO: 29.6 PG (ref 26–34)
MCHC RBC AUTO-ENTMCNC: 32 G/DL (ref 31–37)
MCV RBC AUTO: 92.6 FL
MONOCYTES # BLD AUTO: 0.23 X10(3) UL (ref 0.1–1)
MONOCYTES NFR BLD AUTO: 5.2 %
NEUTROPHILS # BLD AUTO: 2.43 X10 (3) UL (ref 1.5–7.7)
NEUTROPHILS # BLD AUTO: 2.43 X10(3) UL (ref 1.5–7.7)
NEUTROPHILS NFR BLD AUTO: 54.8 %
NITRITE UR QL STRIP.AUTO: NEGATIVE
NONHDLC SERPL-MCNC: 127 MG/DL (ref ?–130)
OSMOLALITY SERPL CALC.SUM OF ELEC: 283 MOSM/KG (ref 275–295)
PH UR STRIP.AUTO: 6.5 [PH] (ref 5–8)
PLATELET # BLD AUTO: 262 10(3)UL (ref 150–450)
POTASSIUM SERPL-SCNC: 5 MMOL/L (ref 3.5–5.1)
PROT SERPL-MCNC: 7.1 G/DL (ref 6.4–8.2)
PROT UR STRIP.AUTO-MCNC: NEGATIVE MG/DL
RBC # BLD AUTO: 4.59 X10(6)UL
RBC UR QL AUTO: NEGATIVE
SODIUM SERPL-SCNC: 136 MMOL/L (ref 136–145)
SP GR UR STRIP.AUTO: 1.01 (ref 1–1.03)
TRIGL SERPL-MCNC: 90 MG/DL (ref 30–149)
TSI SER-ACNC: 1.03 MIU/ML (ref 0.36–3.74)
UROBILINOGEN UR STRIP.AUTO-MCNC: NORMAL MG/DL
VIT D+METAB SERPL-MCNC: 42.3 NG/ML (ref 30–100)
VLDLC SERPL CALC-MCNC: 15 MG/DL (ref 0–30)
WBC # BLD AUTO: 4.4 X10(3) UL (ref 4–11)

## 2023-10-30 PROCEDURE — 36415 COLL VENOUS BLD VENIPUNCTURE: CPT

## 2023-10-30 PROCEDURE — 85025 COMPLETE CBC W/AUTO DIFF WBC: CPT

## 2023-10-30 PROCEDURE — 81003 URINALYSIS AUTO W/O SCOPE: CPT

## 2023-10-30 PROCEDURE — 84443 ASSAY THYROID STIM HORMONE: CPT

## 2023-10-30 PROCEDURE — 80061 LIPID PANEL: CPT

## 2023-10-30 PROCEDURE — 80053 COMPREHEN METABOLIC PANEL: CPT

## 2023-10-30 PROCEDURE — 82306 VITAMIN D 25 HYDROXY: CPT

## 2023-10-31 ENCOUNTER — HOSPITAL ENCOUNTER (OUTPATIENT)
Dept: MAMMOGRAPHY | Age: 63
Discharge: HOME | End: 2023-10-31
Attending: FAMILY MEDICINE
Payer: COMMERCIAL

## 2023-10-31 DIAGNOSIS — Z12.31 SCREENING MAMMOGRAM FOR BREAST CANCER: ICD-10-CM

## 2023-10-31 PROCEDURE — 77063 BREAST TOMOSYNTHESIS BI: CPT | Performed by: FAMILY MEDICINE

## 2023-10-31 PROCEDURE — 77067 SCR MAMMO BI INCL CAD: CPT | Performed by: FAMILY MEDICINE

## 2024-02-03 DIAGNOSIS — Z82.49 FAMILY HISTORY OF CORONARY ARTERY DISEASE: ICD-10-CM

## 2024-02-03 DIAGNOSIS — E78.00 HYPERCHOLESTEREMIA: ICD-10-CM

## 2024-02-06 RX ORDER — ROSUVASTATIN CALCIUM 5 MG/1
5 TABLET, COATED ORAL NIGHTLY
Qty: 30 TABLET | Refills: 2 | Status: SHIPPED | OUTPATIENT
Start: 2024-02-06

## 2024-02-06 NOTE — TELEPHONE ENCOUNTER
LOV:  10/23/2023 for: CPX  Patient advised to RTC on:  CPX in 1 year.    Medication Quantity Refills Start End   rosuvastatin 5 MG Oral Tab 30 tablet 2 10/23/2023 --   Sig:   Take 1 tablet (5 mg total) by mouth nightly.

## 2024-05-30 DIAGNOSIS — Z82.49 FAMILY HISTORY OF CORONARY ARTERY DISEASE: ICD-10-CM

## 2024-05-30 DIAGNOSIS — E78.00 HYPERCHOLESTEREMIA: ICD-10-CM

## 2024-05-31 RX ORDER — ROSUVASTATIN CALCIUM 5 MG/1
5 TABLET, COATED ORAL NIGHTLY
Qty: 30 TABLET | Refills: 0 | Status: SHIPPED | OUTPATIENT
Start: 2024-05-31

## 2024-05-31 NOTE — TELEPHONE ENCOUNTER
LOV:  10/23/2023 for: CPX  Patient advised to RTC on:  CPX in 1 year.   Medication Quantity Refills Start End   ROSUVASTATIN 5 MG Oral Tab 30 tablet 2 2/6/2024 --   Sig:   TAKE 1 TABLET(5 MG) BY MOUTH EVERY NIGHT

## 2024-07-01 DIAGNOSIS — E78.00 HYPERCHOLESTEREMIA: ICD-10-CM

## 2024-07-01 DIAGNOSIS — Z82.49 FAMILY HISTORY OF CORONARY ARTERY DISEASE: ICD-10-CM

## 2024-07-02 RX ORDER — ROSUVASTATIN CALCIUM 5 MG/1
5 TABLET, COATED ORAL NIGHTLY
Qty: 30 TABLET | Refills: 0 | Status: SHIPPED | OUTPATIENT
Start: 2024-07-02

## 2024-07-02 NOTE — TELEPHONE ENCOUNTER
Last office visit: 10/23/2023  Last Refill: 5/31/2024  Return To Clinic: 1 year per last office visit  Protocol: pass           Medication Quantity Refills Start End   ROSUVASTATIN 5 MG Oral Tab 30 tablet 0 5/31/2024 --   Sig:   TAKE 1 TABLET(5 MG) BY MOUTH EVERY NIGHT     Route:   Oral

## 2024-10-30 ENCOUNTER — OFFICE VISIT (OUTPATIENT)
Dept: FAMILY MEDICINE CLINIC | Facility: CLINIC | Age: 64
End: 2024-10-30
Payer: COMMERCIAL

## 2024-10-30 VITALS
DIASTOLIC BLOOD PRESSURE: 74 MMHG | WEIGHT: 119.19 LBS | RESPIRATION RATE: 16 BRPM | HEART RATE: 68 BPM | BODY MASS INDEX: 23.4 KG/M2 | TEMPERATURE: 97 F | HEIGHT: 60 IN | SYSTOLIC BLOOD PRESSURE: 132 MMHG

## 2024-10-30 DIAGNOSIS — Z00.00 PHYSICAL EXAM, ANNUAL: Primary | ICD-10-CM

## 2024-10-30 DIAGNOSIS — Z82.49 FAMILY HISTORY OF CORONARY ARTERY DISEASE: ICD-10-CM

## 2024-10-30 DIAGNOSIS — E78.00 HYPERCHOLESTEREMIA: ICD-10-CM

## 2024-10-30 DIAGNOSIS — Z00.00 LABORATORY EXAMINATION ORDERED AS PART OF A ROUTINE GENERAL MEDICAL EXAMINATION: ICD-10-CM

## 2024-10-30 DIAGNOSIS — Z78.0 POSTMENOPAUSAL: ICD-10-CM

## 2024-10-30 DIAGNOSIS — Z12.31 SCREENING MAMMOGRAM FOR BREAST CANCER: ICD-10-CM

## 2024-10-30 DIAGNOSIS — E55.9 VITAMIN D DEFICIENCY: ICD-10-CM

## 2024-10-30 DIAGNOSIS — Z13.89 SCREENING FOR GENITOURINARY CONDITION: ICD-10-CM

## 2024-10-30 PROCEDURE — 99396 PREV VISIT EST AGE 40-64: CPT | Performed by: FAMILY MEDICINE

## 2024-10-30 RX ORDER — ROSUVASTATIN CALCIUM 5 MG/1
5 TABLET, COATED ORAL NIGHTLY
Qty: 90 TABLET | Refills: 1 | Status: SHIPPED | OUTPATIENT
Start: 2024-10-30

## 2024-10-30 NOTE — PROGRESS NOTES
HPI:   Brigida Rodríguez is a 64 year old female who presents for a complete physical exam. Symptoms: denies discharge, itching, burning or dysuria. Patient has no complains.  Patient has a history of thyroid nodule.  She will for an ultrasound was done by her daughter.   Patient started on rosuvastatin had abnormal CT heart scan score.  Did not take medication for 3 months ran out of the prescription.  She will have a blood work done follow-up in 6 months for medication check.         Immunization History   Administered Date(s) Administered    TDAP 09/26/2012, 07/18/2022      Wt Readings from Last 6 Encounters:   10/30/24 119 lb 3.2 oz (54.1 kg)   10/23/23 119 lb (54 kg)   10/31/22 120 lb (54.4 kg)   07/18/22 123 lb (55.8 kg)   01/11/21 119 lb (54 kg)   10/28/19 120 lb (54.4 kg)     Body mass index is 23.28 kg/m².     Cholesterol, Total (mg/dL)   Date Value   10/30/2023 189   07/20/2022 246 (H)   01/11/2021 252 (H)     CHOLESTEROL, TOTAL (mg/dL)   Date Value   11/18/2015 205 (H)     CHOLESTEROL (mg/dL)   Date Value   05/15/2014 223 (H)     HDL Cholesterol (mg/dL)   Date Value   10/30/2023 62 (H)   07/20/2022 60 (H)   01/11/2021 69 (H)     HDL CHOLESTEROL (mg/dL)   Date Value   11/18/2015 52     HDL CHOL (mg/dL)   Date Value   05/15/2014 59     LDL Cholesterol (mg/dL)   Date Value   10/30/2023 111 (H)   07/20/2022 164 (H)   01/11/2021 160 (H)     LDL-CHOLESTEROL (mg/dL (calc))   Date Value   11/18/2015 137 (H)     LDL CHOLESTROL (mg/dL)   Date Value   05/15/2014 148 (H)     AST (U/L)   Date Value   10/30/2023 17   05/17/2023 20   07/20/2022 15   11/18/2015 19   05/15/2014 15     ALT (U/L)   Date Value   10/30/2023 25   05/17/2023 23   07/20/2022 18   11/18/2015 13   05/15/2014 26      No results found for: \"GLUCOSE\"     Current Outpatient Medications   Medication Sig Dispense Refill    ROSUVASTATIN 5 MG Oral Tab TAKE 1 TABLET(5 MG) BY MOUTH EVERY NIGHT 30 tablet 0      Past Medical History:    Chronic rhinitis       Past Surgical History:   Procedure Laterality Date    Colonoscopy  11/2015    due in 10 y Dr Kan      Family History   Problem Relation Age of Onset    Lipids Father     Heart Disorder Father         PVD    Other (Other) Father         kidney problem    Heart Disorder Mother         MI  at 72 y.o,    Lipids Mother     Hypertension Mother     Hypertension Maternal Grandmother     Other (Other) Maternal Grandmother     Hypertension Sister     Other (Other) Sister         kidney stones      Social History:   Social History     Socioeconomic History    Marital status:    Tobacco Use    Smoking status: Never    Smokeless tobacco: Never   Vaping Use    Vaping status: Never Used   Substance and Sexual Activity    Alcohol use: No     Alcohol/week: 0.0 standard drinks of alcohol    Drug use: No    Sexual activity: Yes     Partners: Male   Other Topics Concern    Caffeine Concern No     Comment: 1 cup of coffee a day    Exercise Yes     Comment: sometimes    Seat Belt Yes    Self-Exams No      : yes. Children: 3.   Exercise: walking.  Diet: watches calories closely     REVIEW OF SYSTEMS:   GENERAL: feels well otherwise  SKIN: denies any unusual skin lesions  EYES:denies blurred vision or double vision  HEENT: denies nasal congestion, sinus pain or ST  LUNGS: denies shortness of breath with exertion  CARDIOVASCULAR: denies chest pain on exertion  GI: denies abdominal pain,denies heartburn  : denies dysuria, vaginal discharge or itching,  MUSCULOSKELETAL: denies back pain  NEURO: denies headaches  PSYCHE: denies depression or anxiety  HEMATOLOGIC: denies hx of anemia  ENDOCRINE: denies thyroid history  ALL/ASTHMA: denies hx of allergy or asthma    EXAM:   /74 (BP Location: Right arm, Patient Position: Sitting, Cuff Size: adult)   Pulse 68   Temp 97 °F (36.1 °C) (Temporal)   Resp 16   Ht 5' (1.524 m)   Wt 119 lb 3.2 oz (54.1 kg)   BMI 23.28 kg/m²   Body mass index is 23.28 kg/m².   GENERAL: well  developed, well nourished,in no apparent distress  SKIN: no rashes,no suspicious lesions,  HEENT: atraumatic, normocephalic,ears and throat are clear  EYES:PERRLA, EOMI, ,conjunctiva are clear  NECK: supple,no adenopathy  CHEST: no chest tenderness  BREAST: no dominant or suspicious mass, no supraclavicular no axilla lymphadenopathy,   LUNGS: clear to auscultation  CARDIO: RRR without murmur  GI: good BS's,no masses, HSM or tenderness   deferred  RECTAL:deffered  MUSCULOSKELETAL: back is not tender,FROM of the back  EXTREMITIES: no cyanosis, clubbing or edema  NEURO: Oriented times three,cranial nerves are intact,motor and sensory are grossly intact    ASSESSMENT AND PLAN:   Brigida Rodríguez is a 64 year old female who presents for a complete physical exam.  Encounter Diagnoses   Name Primary?    Physical exam, annual Yes    Laboratory examination ordered as part of a routine general medical examination     Screening for genitourinary condition     Screening mammogram for breast cancer     Postmenopausal        No orders of the defined types were placed in this encounter.      Meds & Refills for this Visit:  Requested Prescriptions      No prescriptions requested or ordered in this encounter   Call [phone] to schedule Mammogram.   Continue current meds.   Watch diet for fats and carbs.   Stay active.    Do fasting labs.     Imaging & Consults:  [facility] LEXIE 2D+3D SCREENING BILAT (CPT=77067/30533)      Pap and pelvic  up-to-date. Order put in for mammogram. Self breast exam explained. Health maintenance, will check fasting Lipids, CMP, and CBC. Pt is UTD with screening colonoscopy. Pt' s weight is Body mass index is 23.28 kg/m²., recommended low carb diet and aerobic exercise 30 minutes three times weekly.  The patient indicates understanding of these issues and agrees to the plan.  The patient is asked to return for CPX in 1 year.

## 2024-10-30 NOTE — PATIENT INSTRUCTIONS
Call [phone] to schedule Mammogram.   Continue current meds.   Watch diet for fats and carbs.   Stay active.    Do fasting labs.   Refill policies:      Allow 3 business days for refills; controlled substances may take longer.  Contact your pharmacy at least 5-7 business days prior to running out of medication and have them send an electronic request or submit through the \"request refill\" option thru your [employer] account. No need to do both, as multiple requests will create an automated [employer] message to notify of a denial for one of the duplicated requests, causing you undue confusion.   Refills are NOT addressed on weekends; covering physicians do not authorize routine medications on weekends.  No narcotics or controlled substances are refilled after noon on Fridays or by on call physicians.  By law, narcotics cannot be faxed or phoned into your pharmacy.  If your prescription is due for a refill, you may be due for a follow up appointment. Please call our office at [phone] to make an appointment or schedule an appointment via [employer].  To best provide you care, patients receiving routine medications need to be seen at least twice a year. Patients receiving narcotic/controlled substance medications need to be seen at least once every 3 months.  In the event that your preferred pharmacy does not have the requested medication in stock (ie Backordered), it is your responsibility to find another pharmacy that has the requested medication available. We will gladly send a new prescription to that pharmacy at your request.  controlled substances may not be able to be filled out of state due to license restrictions.  If you have a planned trip, it's best to call your pharmacy at least 5-7 business days to prevent any delays in your medication refill.    Scheduling Tests:    If your physician has ordered radiology tests such as MRI or CT scans, please contact Central Scheduling at [phone] right away to  schedule the test.  Once scheduled, the [facility] Centralized Referral Team will work with your insurance carrier to obtain pre-certification or prior authorization.  Depending on your insurance carrier, approval may take 3-10 days.  It is highly recommended patients assure they have received an authorization before having a test performed.  If test is done without insurance authorization, patient may be responsible for the entire amount billed.      Precertification and Prior Authorizations:  If your physician has recommended that you have a procedure or additional testing performed the [facility] Centralized Referral Team will contact your insurance carrier to obtain pre-certification or prior authorization.    You are strongly encouraged to contact your insurance carrier to verify that your procedure/test has been approved and is a COVERED benefit.  Although the [facility] Centralized Referral Team does its due diligence, the insurance carrier gives the disclaimer that \"Although the procedure is authorized, this does not guarantee payment.\"    Ultimately the patient is responsible for payment.   Thank you for your understanding in this matter.  Paperwork Completion:  If you require FMLA or disability paperwork for your recovery, please make sure to either drop it off or have it faxed to our office at [phone]. Be sure the form has your name and date of birth on it.  The form will be faxed to our Forms Department and they will complete it for you.  There is a 25$ fee for all forms that need to be filled out.  Please be aware there is a 10-14 day turnaround time.  You will need to sign a release of information (CRISTIN) form if your paperwork does not come with one.  You may call the Forms Department with any questions at [phone].  Their fax number is [phone].

## 2024-11-06 ENCOUNTER — LAB ENCOUNTER (OUTPATIENT)
Dept: LAB | Age: 64
End: 2024-11-06
Attending: FAMILY MEDICINE
Payer: COMMERCIAL

## 2024-11-06 DIAGNOSIS — E55.9 VITAMIN D DEFICIENCY: ICD-10-CM

## 2024-11-06 DIAGNOSIS — Z00.00 PHYSICAL EXAM, ANNUAL: ICD-10-CM

## 2024-11-06 LAB
ALBUMIN SERPL-MCNC: 4.5 G/DL (ref 3.2–4.8)
ALBUMIN/GLOB SERPL: 1.7 {RATIO} (ref 1–2)
ALP LIVER SERPL-CCNC: 69 U/L
ALT SERPL-CCNC: 12 U/L
ANION GAP SERPL CALC-SCNC: 4 MMOL/L (ref 0–18)
AST SERPL-CCNC: 19 U/L (ref ?–34)
BASOPHILS # BLD AUTO: 0.06 X10(3) UL (ref 0–0.2)
BASOPHILS NFR BLD AUTO: 1.1 %
BILIRUB SERPL-MCNC: 0.4 MG/DL (ref 0.2–1.1)
BUN BLD-MCNC: 16 MG/DL (ref 9–23)
CALCIUM BLD-MCNC: 9.4 MG/DL (ref 8.7–10.4)
CHLORIDE SERPL-SCNC: 106 MMOL/L (ref 98–112)
CHOLEST SERPL-MCNC: 203 MG/DL (ref ?–200)
CO2 SERPL-SCNC: 28 MMOL/L (ref 21–32)
CREAT BLD-MCNC: 0.89 MG/DL
EGFRCR SERPLBLD CKD-EPI 2021: 73 ML/MIN/1.73M2 (ref 60–?)
EOSINOPHIL # BLD AUTO: 0.14 X10(3) UL (ref 0–0.7)
EOSINOPHIL NFR BLD AUTO: 2.6 %
ERYTHROCYTE [DISTWIDTH] IN BLOOD BY AUTOMATED COUNT: 13 %
FASTING PATIENT LIPID ANSWER: YES
FASTING STATUS PATIENT QL REPORTED: YES
GLOBULIN PLAS-MCNC: 2.7 G/DL (ref 2–3.5)
GLUCOSE BLD-MCNC: 99 MG/DL (ref 70–99)
HCT VFR BLD AUTO: 43 %
HDLC SERPL-MCNC: 56 MG/DL (ref 40–59)
HGB BLD-MCNC: 14 G/DL
IMM GRANULOCYTES # BLD AUTO: 0.01 X10(3) UL (ref 0–1)
IMM GRANULOCYTES NFR BLD: 0.2 %
LDLC SERPL CALC-MCNC: 130 MG/DL (ref ?–100)
LYMPHOCYTES # BLD AUTO: 2.35 X10(3) UL (ref 1–4)
LYMPHOCYTES NFR BLD AUTO: 43.2 %
MCH RBC QN AUTO: 29.6 PG (ref 26–34)
MCHC RBC AUTO-ENTMCNC: 32.6 G/DL (ref 31–37)
MCV RBC AUTO: 90.9 FL
MONOCYTES # BLD AUTO: 0.38 X10(3) UL (ref 0.1–1)
MONOCYTES NFR BLD AUTO: 7 %
NEUTROPHILS # BLD AUTO: 2.5 X10 (3) UL (ref 1.5–7.7)
NEUTROPHILS # BLD AUTO: 2.5 X10(3) UL (ref 1.5–7.7)
NEUTROPHILS NFR BLD AUTO: 45.9 %
NONHDLC SERPL-MCNC: 147 MG/DL (ref ?–130)
OSMOLALITY SERPL CALC.SUM OF ELEC: 287 MOSM/KG (ref 275–295)
PLATELET # BLD AUTO: 279 10(3)UL (ref 150–450)
POTASSIUM SERPL-SCNC: 5.3 MMOL/L (ref 3.5–5.1)
PROT SERPL-MCNC: 7.2 G/DL (ref 5.7–8.2)
RBC # BLD AUTO: 4.73 X10(6)UL
SODIUM SERPL-SCNC: 138 MMOL/L (ref 136–145)
TRIGL SERPL-MCNC: 94 MG/DL (ref 30–149)
TSI SER-ACNC: 1.32 UIU/ML (ref 0.55–4.78)
VIT D+METAB SERPL-MCNC: 34.8 NG/ML (ref 30–100)
VLDLC SERPL CALC-MCNC: 17 MG/DL (ref 0–30)
WBC # BLD AUTO: 5.4 X10(3) UL (ref 4–11)

## 2024-11-06 PROCEDURE — 80053 COMPREHEN METABOLIC PANEL: CPT

## 2024-11-06 PROCEDURE — 84443 ASSAY THYROID STIM HORMONE: CPT

## 2024-11-06 PROCEDURE — 80061 LIPID PANEL: CPT

## 2024-11-06 PROCEDURE — 85025 COMPLETE CBC W/AUTO DIFF WBC: CPT

## 2024-11-06 PROCEDURE — 36415 COLL VENOUS BLD VENIPUNCTURE: CPT

## 2024-11-06 PROCEDURE — 82306 VITAMIN D 25 HYDROXY: CPT

## 2024-11-07 DIAGNOSIS — E87.5 HYPERKALEMIA: Primary | ICD-10-CM

## 2024-11-22 ENCOUNTER — HOSPITAL ENCOUNTER (OUTPATIENT)
Dept: MAMMOGRAPHY | Age: 64
Discharge: HOME | End: 2024-11-22
Attending: FAMILY MEDICINE
Payer: COMMERCIAL

## 2024-11-22 DIAGNOSIS — Z12.31 SCREENING MAMMOGRAM FOR BREAST CANCER: ICD-10-CM

## 2024-11-22 PROCEDURE — 77067 SCR MAMMO BI INCL CAD: CPT | Performed by: FAMILY MEDICINE

## 2024-11-22 PROCEDURE — 77063 BREAST TOMOSYNTHESIS BI: CPT | Performed by: FAMILY MEDICINE
